# Patient Record
Sex: MALE | Race: WHITE
[De-identification: names, ages, dates, MRNs, and addresses within clinical notes are randomized per-mention and may not be internally consistent; named-entity substitution may affect disease eponyms.]

---

## 2017-09-18 ENCOUNTER — HOSPITAL ENCOUNTER (OUTPATIENT)
Dept: HOSPITAL 47 - ORWHC2ENDO | Age: 62
Discharge: HOME | End: 2017-09-18
Payer: COMMERCIAL

## 2017-09-18 VITALS — TEMPERATURE: 98 F | RESPIRATION RATE: 16 BRPM

## 2017-09-18 VITALS — DIASTOLIC BLOOD PRESSURE: 86 MMHG | HEART RATE: 63 BPM | SYSTOLIC BLOOD PRESSURE: 131 MMHG

## 2017-09-18 VITALS — BODY MASS INDEX: 34 KG/M2

## 2017-09-18 DIAGNOSIS — Z91.013: ICD-10-CM

## 2017-09-18 DIAGNOSIS — Z79.51: ICD-10-CM

## 2017-09-18 DIAGNOSIS — Z12.11: Primary | ICD-10-CM

## 2017-09-18 DIAGNOSIS — Z85.46: ICD-10-CM

## 2017-09-18 DIAGNOSIS — Z79.2: ICD-10-CM

## 2017-09-18 DIAGNOSIS — E07.9: ICD-10-CM

## 2017-09-18 DIAGNOSIS — E78.5: ICD-10-CM

## 2017-09-18 DIAGNOSIS — I10: ICD-10-CM

## 2017-09-18 DIAGNOSIS — Z79.899: ICD-10-CM

## 2017-09-18 PROCEDURE — 45378 DIAGNOSTIC COLONOSCOPY: CPT

## 2017-09-18 NOTE — P.PCN
Date of Procedure: 09/18/17


Procedure(s) Performed: 


Procedure: Total colonoscopy.





Preoperative diagnosis: Screening for neoplasia.





Postoperative diagnosis: Exam within normal limits.





Preparation: HalfLytely prep.





Sedation: Was provided by anesthesia.





Brief clinical history: The patient is a 62-year-old male who is scheduled for 

this evaluation because of age as his risk factor, for screening for neoplasia.

  There is no family history of colon cancer. He has no abdominal complaints, 

bleeding or anemia.  His prior colonoscopy was around 10-11 years ago.  





Procedure: With the patient on his left lateral decubitus position and after 

informed consent and adequate sedation, the perianal area was inspected and it 

did not show any fissures or fistulas.  There were no masses felt on digital 

rectal examination.  The Olympus CFQ 160L video colonoscope was then inserted 

in the rectum in the usual fashion and advanced to the cecum.  The mucosa 

appeared healthy.  No polyps or tumors were seen or any obvious diverticular 

disease or other pathology.  I retroflexed the endoscope in the rectum before 

the endoscope was withdrawn.





The patient tolerated the procedure well.





Plan: The patient was reassured.  He will follow up with you as planned and I 

recommended repeat exam in 10 years.

## 2018-02-13 ENCOUNTER — HOSPITAL ENCOUNTER (OUTPATIENT)
Dept: HOSPITAL 47 - RADXRMAIN | Age: 63
Discharge: HOME | End: 2018-02-13
Payer: COMMERCIAL

## 2018-02-13 ENCOUNTER — HOSPITAL ENCOUNTER (OUTPATIENT)
Dept: HOSPITAL 47 - RADCTMAIN | Age: 63
Discharge: HOME | End: 2018-02-13
Payer: COMMERCIAL

## 2018-02-13 DIAGNOSIS — J98.11: ICD-10-CM

## 2018-02-13 DIAGNOSIS — R91.8: ICD-10-CM

## 2018-02-13 DIAGNOSIS — J98.11: Primary | ICD-10-CM

## 2018-02-13 DIAGNOSIS — I51.7: Primary | ICD-10-CM

## 2018-02-13 PROCEDURE — 71250 CT THORAX DX C-: CPT

## 2018-02-13 PROCEDURE — 71046 X-RAY EXAM CHEST 2 VIEWS: CPT

## 2018-02-13 NOTE — XR
EXAMINATION TYPE: XR chest 2V

 

DATE OF EXAM: 2/13/2018

 

COMPARISON: NONE

 

HISTORY: Cough for 10 days.

 

TECHNIQUE:  Frontal and lateral views of the chest are obtained.

 

FINDINGS:  There is elevated left hemidiaphragm with tiny left pleural effusion as there is blunting 
of left costophrenic angle and left basilar linear and increased opacity consistent with atelectasis 
and/or infiltrate. The right lung is clear..  The cardiac silhouette size is mildly enlarged.   The o
sseous structures are intact.

 

IMPRESSION:  Mild cardiomegaly with tiny left pleural effusion and left basilar infiltrate and more s
uperior linear atelectasis.

## 2018-02-13 NOTE — CT
EXAMINATION TYPE: CT chest wo con

 

DATE OF EXAM: 2/13/2018

 

COMPARISON: NONE

 

HISTORY: Patient complains of cough and chest congestion unresponsive to treatment.

 

CT DLP: 435 mGycm

 

Unenhanced CT of the chest was performed with lung and mediastinal window settings submitted.  The la
ck of contrast limits evaluation of the vascular, mediastinal and parenchymal structures including th
e upper abdomen.

 

LUNGS: The lungs are clear and free of infiltrate. Linear basilar atelectasis.  No pulmonary nodule o
r mass is detected.  No pleural effusion.  No CT evidence of interstitial lung disease.

 

MEDIASTINUM/FAY:  Thoracic aorta is of normal caliber with limited evaluation given lack of contrast
.  The heart is not enlarged.  No evidence for mediastinal mass.  No  lymph nodes greater than 1cm.

 

UPPER ABDOMEN:  No significant abnormality is seen.

 

OTHER: No significant other abnormality.

 

IMPRESSION:

 

1.  The lungs are clear and free of infiltrate. Linear basilar atelectasis.

## 2020-02-26 ENCOUNTER — HOSPITAL ENCOUNTER (OUTPATIENT)
Dept: HOSPITAL 47 - RADUSWWP | Age: 65
Discharge: HOME | End: 2020-02-26
Attending: FAMILY MEDICINE
Payer: COMMERCIAL

## 2020-02-26 DIAGNOSIS — M25.411: Primary | ICD-10-CM

## 2020-02-26 NOTE — US
EXAMINATION TYPE: US extremity nonvasc mass RT

 

DATE OF EXAM: 2/26/2020

 

COMPARISON: NONE

 

CLINICAL HISTORY: R33.21 RIGHT SHOULDER SWELLING.

 

Hyperechoic oval structure at patients palpable right shoulder mass. 

Probable lipoma.

 

 

 

 

 

IMPRESSION:  Findings compatible with lipoma

## 2022-10-10 ENCOUNTER — HOSPITAL ENCOUNTER (OUTPATIENT)
Dept: HOSPITAL 47 - RADXRMAIN | Age: 67
Discharge: HOME | End: 2022-10-10
Attending: FAMILY MEDICINE
Payer: MEDICARE

## 2022-10-10 DIAGNOSIS — M79.2: ICD-10-CM

## 2022-10-10 DIAGNOSIS — M79.671: ICD-10-CM

## 2022-10-10 DIAGNOSIS — M51.26: ICD-10-CM

## 2022-10-10 DIAGNOSIS — M50.30: Primary | ICD-10-CM

## 2022-10-10 PROCEDURE — 72040 X-RAY EXAM NECK SPINE 2-3 VW: CPT

## 2022-10-10 PROCEDURE — 72100 X-RAY EXAM L-S SPINE 2/3 VWS: CPT

## 2022-10-10 NOTE — XR
EXAMINATION TYPE: XR foot limited RT

 

DATE OF EXAM: 10/10/2022 10:27 AM

 

INDICATION: 

Patient age:Male;  67 years old; 

Reason for study:  [id number]; [facility]. 

 

COMPARISON: None

 

TECHNIQUE: The right foot was examined in the AP and lateral projections..  

 

FINDINGS: 

No evidence of any acute osseous pathology.  No evidence of soft tissue swelling. Joints are preserve
d. Vascular sclerosis.

 

IMPRESSION: 

No evidence of acute fracture.

## 2022-10-10 NOTE — XR
EXAMINATION TYPE: XR cervical spine limited

 

DATE OF EXAM: 10/10/2022 10:27 AM

 

INDICATION: 

Patient age:Male;  67 years old; 

Reason for study:  [id number]; [facility]. 

 

COMPARISON:

 

TECHNIQUE: The cervical spine was imaged in 4 projections. Frontal, lateral, odontoid and swimmer's p
rojections.

 

FINDINGS:

The osseous structures show normal alignment without evidence of an acute fracture. There are osteoph
ytes noted throughout the cervical spine on the anterior and lateral aspects of the vertebral bodies.
 The intervertebral disk spaces are preserved.  Pedicles are intact.  Soft tissues are within normal 
limits. The odontoid appears intact.

 

IMPRESSION: 

1. No fracture or dislocation.

 

2. Mild degenerative disc disease changes of the cervical spine.

## 2022-10-10 NOTE — XR
EXAMINATION TYPE: XR lumbar spine 2 or 3V

 

DATE OF EXAM: 10/10/2022

 

CLINICAL HISTORY:  [id number], numbness down his right arm and right leg.

 

TECHNIQUE: Three views of the lumbar spine are submitted.

 

COMPARISON: None.

 

FINDINGS:  

There are 5 lumbar type vertebral bodies identified.  The lumbar spine shows satisfactory alignment w
ithout evidence of acute fracture or dislocation. Vertebral body heights are within normal limits.   
Mild disc space narrowing most pronounced at L5-S1. L4-L5 and L5-S1 endplate sclerosis. Multilevel an
terior osteophytosis.  The overlying soft tissue appears unremarkable.

 

IMPRESSION:  

1.  No acute fracture or dislocation is seen in the lumbar spine.

2.  Mild degenerative disc disease.

## 2022-10-26 ENCOUNTER — HOSPITAL ENCOUNTER (OUTPATIENT)
Dept: HOSPITAL 47 - RADCTMAIN | Age: 67
Discharge: HOME | End: 2022-10-26
Attending: FAMILY MEDICINE
Payer: MEDICARE

## 2022-10-26 DIAGNOSIS — M47.816: Primary | ICD-10-CM

## 2022-10-26 DIAGNOSIS — M47.812: ICD-10-CM

## 2022-10-26 DIAGNOSIS — M43.12: ICD-10-CM

## 2022-10-26 PROCEDURE — 72131 CT LUMBAR SPINE W/O DYE: CPT

## 2022-10-26 PROCEDURE — 72125 CT NECK SPINE W/O DYE: CPT

## 2022-10-26 NOTE — CT
EXAMINATION TYPE: CT cervical spine wo con

 

DATE OF EXAM: 10/26/2022

 

COMPARISON: X-ray cervical spine Limited October 10, 2022

 

HISTORY: Numbness in arms/hands x a few months

 

CT DLP: 855.3 mGycm.  Automated Exposure Control for Dose Reduction was Utilized.

 

TECHNIQUE:  CT scan of the cervical spine is obtained without contrast, axial images are obtained, sa
gittal and coronal reformatted images are also reviewed.

 

FINDINGS: Cervical spine is visualized in its entirety from C1 through upper thoracic levels, demonst
rates slight grade 1 retrolisthesis C3 on C4 without evidence of acute fracture or dislocation.  Prev
ertebral soft tissue appears within normal limits.  The C1-C2 articulation is within normal limits on
 the coronal images. Vertebral body heights and disc space heights are maintained. Spinal canal gross
ly preserved.

 

Axial images at C3-C4 levels show spondylolisthesis with broad-based left paracentral disc protrusion
 mildly effaces anterior thecal sac and causing moderate to severe right-sided neural foraminal narro
wing due to foraminal component on axial image 55. Remainder axial levels appear within normal limits
. Thyroid gland is felt within normal limits. Visualized lung apices are clear. Mild calcified plaque
 bilateral carotid bulb level.

 

IMPRESSION: Spondylolisthesis and degenerative change C3-C4 level as detailed above.  . Consider MRI 
follow-up to better evaluate/confirm suspected CT findings.

## 2022-10-26 NOTE — CT
EXAMINATION TYPE: CT lumbar spine wo con

 

DATE OF EXAM: 10/26/2022 8:46 AM

 

COMPARISON: X-ray lumbar spine October 10, 2022.

 

HISTORY: Pain mostly on right side after activity.

 

CT DLP: 1523.0 mGycm

Automated exposure control for dose reduction was used.

 

Unenhanced CT of the lumbar spine was performed.  Bone and soft tissue window settings are submitted 
as well as coronal and sagittal reconstructions. 

 

There are 5 lumbar-type vertebra present. Mild-to-moderate disc space narrowing and anterior spurring
 at L2-L3 level. Mild disc space narrowing with vacuum disc phenomenon and mild anterior and lateral 
spurring at L4-L5 level. Mild to moderate spurring and mild disc space narrowing at L3-L4 level. Alig
nment is satisfactory and stable. Vertebral body heights are preserved.

 

Axial images show T12-L1 and L1-L2 level to appear within normal limits.

 

Axial images at L2-L3 level show mild to moderate broad disc bulge mildly effaces the anterior thecal
 sac along with mild facet arthropathy bilaterally. Mild bilateral anterior inferior neural foraminal
 narrowing is seen.

 

Axial images at L3-L4 level show mild-to-moderate broad-based right paracentral/foraminal disc protru
meron effacing anterolateral thecal sac with mild facet arthropathy bilaterally. There is mild bilater
al anterior inferior neural foraminal narrowing.

 

Axial images at L4-L5 level moderate broad disc bulge along with mild-to-moderate facet arthropathy b
ilaterally. There is mild effacement anterior thecal sac. There is mild to moderate bilateral anterio
r inferior neural foraminal narrowing.

 

Axial images at the L5-S1 level shows broad-based right paracentral disc protrusion. There is mild-to
-moderate facet arthropathy bilaterally. Bilateral neural foramina are patent.

 

Portions of normal-appearing appendix noted near axial image 56. There is exophytic 9 mm round hyperd
ense lesion from the lower pole left kidney favoring proteinaceous cyst but nonspecific axial image 4
6. A few sigmoid colonic diverticula are partially imaged.

 

 IMPRESSION: Multilevel degenerative changes in the lumbar spine as detailed above.

## 2025-01-30 ENCOUNTER — HOSPITAL ENCOUNTER (OUTPATIENT)
Dept: HOSPITAL 47 - LABWHC1 | Age: 70
Discharge: HOME | End: 2025-01-30
Attending: FAMILY MEDICINE
Payer: MEDICARE

## 2025-01-30 DIAGNOSIS — B89: ICD-10-CM

## 2025-01-30 DIAGNOSIS — Z20.822: Primary | ICD-10-CM

## 2025-01-30 PROCEDURE — 87636 SARSCOV2 & INF A&B AMP PRB: CPT

## 2025-06-19 ENCOUNTER — HOSPITAL ENCOUNTER (OUTPATIENT)
Dept: HOSPITAL 47 - ORWHC2ENDO | Age: 70
Discharge: HOME | End: 2025-06-19
Attending: SURGERY
Payer: MEDICARE

## 2025-06-19 VITALS — RESPIRATION RATE: 18 BRPM | HEART RATE: 78 BPM | DIASTOLIC BLOOD PRESSURE: 63 MMHG | SYSTOLIC BLOOD PRESSURE: 116 MMHG

## 2025-06-19 VITALS — TEMPERATURE: 97.2 F

## 2025-06-19 VITALS — BODY MASS INDEX: 34.4 KG/M2

## 2025-06-19 DIAGNOSIS — K44.9: ICD-10-CM

## 2025-06-19 DIAGNOSIS — D12.2: ICD-10-CM

## 2025-06-19 DIAGNOSIS — K29.70: ICD-10-CM

## 2025-06-19 DIAGNOSIS — K21.00: ICD-10-CM

## 2025-06-19 DIAGNOSIS — Z12.11: Primary | ICD-10-CM

## 2025-06-19 PROCEDURE — 88305 TISSUE EXAM BY PATHOLOGIST: CPT

## 2025-06-19 PROCEDURE — 43239 EGD BIOPSY SINGLE/MULTIPLE: CPT

## 2025-06-19 PROCEDURE — 45380 COLONOSCOPY AND BIOPSY: CPT

## 2025-06-19 PROCEDURE — 45385 COLONOSCOPY W/LESION REMOVAL: CPT

## 2025-06-19 RX ADMIN — POTASSIUM CHLORIDE SCH MLS/HR: 14.9 INJECTION, SOLUTION INTRAVENOUS at 08:35

## 2025-06-19 RX ADMIN — LIDOCAINE HYDROCHLORIDE PRN ML: 10 INJECTION, SOLUTION INFILTRATION; PERINEURAL at 08:35

## 2025-06-19 RX ADMIN — POTASSIUM CHLORIDE ONE MLS: 14.9 INJECTION, SOLUTION INTRAVENOUS at 08:35

## 2025-06-29 ENCOUNTER — HOSPITAL ENCOUNTER (OUTPATIENT)
Dept: HOSPITAL 47 - RADMRIMAIN | Age: 70
Discharge: HOME | End: 2025-06-29
Attending: FAMILY MEDICINE
Payer: MEDICARE

## 2025-06-29 DIAGNOSIS — N40.0: ICD-10-CM

## 2025-06-29 DIAGNOSIS — R97.20: Primary | ICD-10-CM

## 2025-06-29 PROCEDURE — 72197 MRI PELVIS W/O & W/DYE: CPT
